# Patient Record
Sex: MALE | Race: WHITE | URBAN - METROPOLITAN AREA
[De-identification: names, ages, dates, MRNs, and addresses within clinical notes are randomized per-mention and may not be internally consistent; named-entity substitution may affect disease eponyms.]

---

## 2021-02-11 ENCOUNTER — OFFICE VISIT (OUTPATIENT)
Dept: GASTROENTEROLOGY | Age: 26
End: 2021-02-11
Payer: COMMERCIAL

## 2021-02-11 VITALS — OXYGEN SATURATION: 99 % | HEART RATE: 79 BPM | WEIGHT: 200.2 LBS | HEIGHT: 70 IN | BODY MASS INDEX: 28.66 KG/M2

## 2021-02-11 DIAGNOSIS — K51.00 ULCERATIVE PANCOLITIS WITHOUT COMPLICATION (HCC): Primary | ICD-10-CM

## 2021-02-11 PROCEDURE — 99204 OFFICE O/P NEW MOD 45 MIN: CPT | Performed by: INTERNAL MEDICINE

## 2021-02-11 RX ORDER — MESALAMINE 1.2 G/1
3600 TABLET, DELAYED RELEASE ORAL
Qty: 90 TABLET | Refills: 3 | Status: SHIPPED | OUTPATIENT
Start: 2021-02-11 | End: 2021-05-24

## 2021-02-11 RX ORDER — MESALAMINE 1.2 G/1
TABLET, DELAYED RELEASE ORAL
COMMUNITY
Start: 2020-08-21 | End: 2021-02-11

## 2021-02-11 RX ORDER — SODIUM, POTASSIUM,MAG SULFATES 17.5-3.13G
SOLUTION, RECONSTITUTED, ORAL ORAL
Qty: 1 BOTTLE | Refills: 0 | Status: SHIPPED | OUTPATIENT
Start: 2021-02-11

## 2021-02-11 NOTE — PROGRESS NOTES
Gastroenterology Clinic Visit    Nancy Winston  <[id number]>  Chief Complaint   Patient presents with    New Patient     HPI: 27 y.o. male presents to the clinic to establish care and with history of ulcerative colitis. Patient diagnosed in November 2019 when presented with diarrhea, abdominal cramps and rectal bleeding. Colonoscopy performed at [facility] gastroenterology by Dr. Nii Slaughter. Inflammation was noted extending from anus to the cecum consistent with moderate pan ulcerative colitis. Patient has been on Lialda 2.4 g daily. Patient reports having 2-3 flares since the diagnosis. Patient has been on budesonide for each of the flares with good response, however over the last flare in January he did not have a good response in controlling his symptoms with Uceris. He at this time is having 4-5 loose bowels a day, reports having blood in 1 or 2 bowel movements through the week. He does have abdominal cramping and discomfort. He has had some intentional weight loss. Appetite has been stable. He has improved his diet and has noticed some improvement in his symptoms. It appears Lialda was helping him control his symptoms. He has recurrent severe headaches, takes Motrin for the headaches which results in exacerbation of his colitis    Previous GI work up/Endoscopic investigations:   Colonoscopy: 11/22/2019: Moderate and ulcerative colitis    Review of Systems   All other systems reviewed and are negative. Past Medical History:   Diagnosis Date    ETD (eustachian tube dysfunction) 7/22/2015    Otalgia of right ear 7/22/2015   History reviewed. No pertinent surgical history.   Current Outpatient Medications on File Prior to Visit   Medication Sig Dispense Refill    azelastine (ASTELIN) 0.1 % nasal spray 2 sprays by Nasal route 2 times daily Use in each nostril as directed (Patient not taking: Reported on 2/11/2021) 1 Bottle 3    fluticasone (FLONASE) 50 MCG/ACT SUSP 1 spray by Nasal route nightly (Patient not taking: Reported on 2/11/2021) 1 Bottle 0     No current facility-administered medications on file prior to visit. Family History   Problem Relation Age of Onset    Colon Cancer Paternal Grandfather     Crohn's Disease Neg Hx      Social History     Socioeconomic History    Marital status: Single     Spouse name: None    Number of children: None    Years of education: None    Highest education level: None   Occupational History    None   Social Needs    Financial resource strain: None    Food insecurity     Worry: None     Inability: None    Transportation needs     Medical: None     Non-medical: None   Tobacco Use    Smoking status: Never Smoker    Smokeless tobacco: Never Used   Substance and Sexual Activity    Alcohol use: Yes     Comment: socially    Drug use: No    Sexual activity: Yes     Partners: Female   Lifestyle    Physical activity     Days per week: None     Minutes per session: None    Stress: None   Relationships    Social connections     Talks on phone: None     Gets together: None     Attends Confucianism service: None     Active member of club or organization: None     Attends meetings of clubs or organizations: None     Relationship status: None    Intimate partner violence     Fear of current or ex partner: None     Emotionally abused: None     Physically abused: None     Forced sexual activity: None   Other Topics Concern    None   Social History Narrative    ** Merged History Encounter **          Pulse 79, height 5' 10\" (1.778 m), weight 200 lb 3.2 oz (90.8 kg), SpO2 99 %. Physical Exam  Constitutional:       General: He is not in acute distress. Appearance: Normal appearance. He is well-developed. Eyes:      General: No scleral icterus. Cardiovascular:      Rate and Rhythm: Normal rate and regular rhythm. Pulmonary:      Effort: Pulmonary effort is normal.      Breath sounds: Normal breath sounds.    Abdominal:      General: Bowel sounds are to evaluate disease activity    Return in about 2 weeks (around 2/25/2021). Dashawn Harrington MD   Staff Gastroenterologist  [facility]    Please note this report has been partially produced using speech recognition software and may cause contain errors related to thatsystem including grammar, punctuation and spelling as well as words and phrases that may seem inappropriate. If there are questions or concerns please feel free to contact me to clarify.

## 2021-02-24 DIAGNOSIS — K51.00 ULCERATIVE PANCOLITIS WITHOUT COMPLICATION (HCC): ICD-10-CM

## 2021-02-24 LAB
C-REACTIVE PROTEIN: 7.1 MG/L (ref 0–5)
HCT VFR BLD CALC: 40.7 % (ref 42–52)
HEMOGLOBIN: 13.1 G/DL (ref 14–18)
MCH RBC QN AUTO: 28.4 PG (ref 27–31.3)
MCHC RBC AUTO-ENTMCNC: 32.3 % (ref 33–37)
MCV RBC AUTO: 88.2 FL (ref 80–100)
PDW BLD-RTO: 14.3 % (ref 11.5–14.5)
PLATELET # BLD: 543 K/UL (ref 130–400)
RBC # BLD: 4.62 M/UL (ref 4.7–6.1)
SEDIMENTATION RATE, ERYTHROCYTE: 15 MM (ref 0–10)
WBC # BLD: 14.4 K/UL (ref 4.8–10.8)

## 2021-02-25 ENCOUNTER — VIRTUAL VISIT (OUTPATIENT)
Dept: GASTROENTEROLOGY | Age: 26
End: 2021-02-25
Payer: COMMERCIAL

## 2021-02-25 DIAGNOSIS — K51.00 ULCERATIVE PANCOLITIS WITHOUT COMPLICATION (HCC): Primary | ICD-10-CM

## 2021-02-25 PROCEDURE — 99214 OFFICE O/P EST MOD 30 MIN: CPT | Performed by: INTERNAL MEDICINE

## 2021-02-25 RX ORDER — PREDNISONE 10 MG/1
TABLET ORAL
Qty: 140 TABLET | Refills: 0 | Status: SHIPPED | OUTPATIENT
Start: 2021-02-25

## 2021-02-25 NOTE — PROGRESS NOTES
2021    TELEHEALTH EVALUATION -- Audio/Visual (During XPJDL-65 public health emergency)    Due to COVID 19 outbreak, patient's office visit was converted to a virtual visit. Patient was contacted and agreed to proceed with a virtual visit via [email or web address]  The risks and benefits of converting to a virtual visit were discussed in light of the current infectious disease epidemic. Patient also understood that insurance coverage and co-pays are up to their individual insurance plans. Chief Complaint   Patient presents with    Follow-up     Text [phone]. Patient's mesalamine dosage was recently increased, cramping had reduced but still having some symptoms. Currently having 4-5 BM's daily, still occasional blood in stool, but less than before      HPI:  Patient Location: Home  Provider location: Office    Garrett Jackson (:  1995) following up after last visit on 2021 with symptoms of diarrhea rectal bleeding and abdominal cramps in the setting of ulcerative colitis. Patient reports diagnosis since 2019. At last visit patient was advised to increase the Lialda to 3.6 g daily. Interval change: Patient reports no significant change in the frequency of bowel movements continues to have 4-6 loose bowel movements through the day, reports decrease in blood and has had multiple stools without blood. Denies any change in appetite weight loss or abdominal pain. Does report to urgency with bowel movements. Was advised to have routine blood work and stool calprotectin. Labs as noted below, stool calprotectin still pending. Background: Diagnosis of colitis made in 2019 after colonoscopy at [facility] gastroenterology by Dr. Marylen Sovereign. Patient noted to have pan ulcerative colitis. Started on Lialda and has had 3 courses of Uceris over the ear with flareups. Previous GI work up/Endoscopic investigations:   Colonoscopy: 2019:  Moderate and ulcerative colitis    Review of Systems All other systems reviewed and are negative. Prior to Visit Medications    Medication Sig Taking? Authorizing Provider   predniSONE (DELTASONE) 10 MG tablet Take 4 tablets daily for 2 weeks then taper by 10 mg every 2 weeks Yes Toyin Harris MD   mesalamine (LIALDA) 1.2 g EC tablet Take 3 tablets by mouth daily (with breakfast) Yes Toyin aHrris MD   azelastine (ASTELIN) 0.1 % nasal spray 2 sprays by Nasal route 2 times daily Use in each nostril as directed Yes Dutch Guerra,    fluticasone (FLONASE) 50 MCG/ACT SUSP 1 spray by Nasal route nightly Yes BISHOP Lobato   Na Sulfate-K Sulfate-Mg Sulf 17.5-3.13-1.6 GM/177ML SOLN As directed  Patient not taking: Reported on 2/25/2021  Toyin Harris MD     Social History     Tobacco Use    Smoking status: Never Smoker    Smokeless tobacco: Never Used   Substance Use Topics    Alcohol use: Yes     Comment: socially    Drug use: No      PMH, PSH, FH and allergies reviewed and updated    PHYSICAL EXAMINATION:  [ INSTRUCTIONS:  \"[x]\" Indicates a positive item  \"[]\" Indicates a negative item  -- DELETE ALL ITEMS NOT EXAMINED]  [x] Alert  [x] Oriented to person/place/time    [x] No apparent distress   [x] Normal Mood  [] Anxious appearing    [] Depressed appearing  [] Confused appearing      [] Poor short term memory  [] Poor long term memory    [] OTHER:      Due to this being a TeleHealth encounter, evaluation of the following organ systems is limited: Vitals/Constitutional/EENT/Resp/CV/GI//MS/Neuro/Skin/Heme-Lymph-Imm. ASSESSMENT/PLAN:    27 y.o. male with known diagnosis of ulcerative colitis since 2019. Lialda increased to 3.6 g at last visit 2 weeks ago, patient with 3 flares over the last year, managed with Uceris during flares. Patient at last visit describes Uceris being ineffective. Not particularly keen on escalating to biologic therapy. Would prefer to exhaust all oral and nonbiologic resources prior to trying Biologics.   Patient not keen on trying Rowasa enemas. Patient scheduled for colonoscopy on 3/8/2021.    -Start course of steroids (start at 40 mg once daily with taper by 10 mg every 2 weeks)    Return in about 5 weeks (around 4/1/2021). An electronic signature was used to authenticate this note. --Junnie Ormond, MD on 2/25/2021 at 2:08 PM  Gastroenterology  [address] to the emergency declaration under the [facility], [facility] waiver authority and the Coronavirus Preparedness and [employer] Act, this Virtual Visit was conducted, with patient's consent, to reduce the patient's risk of exposure to COVID-19 and provide continuity of care for an established patient. Services were provided through a video synchronous discussion virtually to substitute for in-person clinic visit. Please note this report has been partially produced using speech recognition software and may cause contain errors related to thatsystem including grammar, punctuation and spelling as well as words and phrases that may seem inappropriate. If there are questions or concerns please feel free to contact me to clarify.

## 2021-02-27 LAB — CALPROTECTIN, FECAL: 1330 UG/G

## 2021-05-24 RX ORDER — MESALAMINE 1.2 G/1
TABLET, DELAYED RELEASE ORAL
Qty: 90 TABLET | Refills: 3 | Status: SHIPPED | OUTPATIENT
Start: 2021-05-24